# Patient Record
Sex: FEMALE | Race: WHITE | NOT HISPANIC OR LATINO | Employment: UNEMPLOYED | URBAN - METROPOLITAN AREA
[De-identification: names, ages, dates, MRNs, and addresses within clinical notes are randomized per-mention and may not be internally consistent; named-entity substitution may affect disease eponyms.]

---

## 2017-12-28 PROBLEM — R51.9 NONINTRACTABLE HEADACHE: Status: ACTIVE | Noted: 2017-12-28

## 2017-12-28 PROBLEM — R42 EPISODIC LIGHTHEADEDNESS: Status: ACTIVE | Noted: 2017-12-28

## 2017-12-28 PROBLEM — Z56.6 STRESS AT WORK: Status: ACTIVE | Noted: 2017-12-28

## 2018-08-20 PROBLEM — E78.1 HYPERTRIGLYCERIDEMIA: Status: ACTIVE | Noted: 2018-08-20

## 2019-03-05 PROBLEM — Z56.6 STRESS AT WORK: Status: RESOLVED | Noted: 2017-12-28 | Resolved: 2019-03-05

## 2021-11-04 PROBLEM — R42 EPISODIC LIGHTHEADEDNESS: Status: RESOLVED | Noted: 2017-12-28 | Resolved: 2021-11-04

## 2021-11-04 PROBLEM — R51.9 NONINTRACTABLE HEADACHE: Status: RESOLVED | Noted: 2017-12-28 | Resolved: 2021-11-04

## 2021-11-04 PROBLEM — M47.816 ARTHRITIS OF FACET JOINT OF LUMBAR SPINE: Status: ACTIVE | Noted: 2021-11-04

## 2023-02-01 PROBLEM — Z79.899 ENCOUNTER FOR LONG-TERM (CURRENT) USE OF MEDICATIONS: Status: ACTIVE | Noted: 2023-02-01

## 2023-02-01 PROBLEM — M54.50 MIDLINE LOW BACK PAIN WITHOUT SCIATICA: Status: ACTIVE | Noted: 2023-02-01

## 2023-02-01 PROBLEM — Z76.89 ENCOUNTER TO ESTABLISH CARE WITH NEW DOCTOR: Status: ACTIVE | Noted: 2023-02-01

## 2023-02-01 PROBLEM — Z12.11 ENCOUNTER FOR SCREENING FOR MALIGNANT NEOPLASM OF COLON: Status: ACTIVE | Noted: 2023-02-01

## 2023-02-01 PROBLEM — Z12.39 ENCOUNTER FOR BREAST CANCER SCREENING USING NON-MAMMOGRAM MODALITY: Status: ACTIVE | Noted: 2023-02-01

## 2023-03-23 ENCOUNTER — APPOINTMENT (OUTPATIENT)
Dept: ADMISSIONS | Facility: MEDICAL CENTER | Age: 53
DRG: 743 | End: 2023-03-23
Attending: SPECIALIST
Payer: MEDICAID

## 2023-03-24 ENCOUNTER — PRE-ADMISSION TESTING (OUTPATIENT)
Dept: ADMISSIONS | Facility: MEDICAL CENTER | Age: 53
DRG: 743 | End: 2023-03-24
Attending: SPECIALIST
Payer: MEDICAID

## 2023-03-24 ENCOUNTER — HOSPITAL ENCOUNTER (OUTPATIENT)
Dept: RADIOLOGY | Facility: MEDICAL CENTER | Age: 53
DRG: 743 | End: 2023-03-24
Attending: SPECIALIST | Admitting: SPECIALIST
Payer: MEDICAID

## 2023-03-24 DIAGNOSIS — Z01.811 PRE-OPERATIVE RESPIRATORY EXAMINATION: ICD-10-CM

## 2023-03-24 DIAGNOSIS — Z01.812 PRE-OPERATIVE LABORATORY EXAMINATION: ICD-10-CM

## 2023-03-24 DIAGNOSIS — Z01.810 PRE-OPERATIVE CARDIOVASCULAR EXAMINATION: ICD-10-CM

## 2023-03-24 LAB
ABO GROUP BLD: NORMAL
ALBUMIN SERPL BCP-MCNC: 4.5 G/DL (ref 3.2–4.9)
ALBUMIN/GLOB SERPL: 1.8 G/DL
ALP SERPL-CCNC: 89 U/L (ref 30–99)
ALT SERPL-CCNC: 18 U/L (ref 2–50)
ANION GAP SERPL CALC-SCNC: 10 MMOL/L (ref 7–16)
APTT PPP: 25.2 SEC (ref 24.7–36)
AST SERPL-CCNC: 20 U/L (ref 12–45)
BASOPHILS # BLD AUTO: 0.9 % (ref 0–1.8)
BASOPHILS # BLD: 0.05 K/UL (ref 0–0.12)
BILIRUB SERPL-MCNC: 0.4 MG/DL (ref 0.1–1.5)
BLD GP AB SCN SERPL QL: NORMAL
BUN SERPL-MCNC: 18 MG/DL (ref 8–22)
CALCIUM ALBUM COR SERPL-MCNC: 8.8 MG/DL (ref 8.5–10.5)
CALCIUM SERPL-MCNC: 9.2 MG/DL (ref 8.5–10.5)
CHLORIDE SERPL-SCNC: 104 MMOL/L (ref 96–112)
CO2 SERPL-SCNC: 25 MMOL/L (ref 20–33)
CREAT SERPL-MCNC: 0.85 MG/DL (ref 0.5–1.4)
EKG IMPRESSION: NORMAL
EOSINOPHIL # BLD AUTO: 0.12 K/UL (ref 0–0.51)
EOSINOPHIL NFR BLD: 2.2 % (ref 0–6.9)
ERYTHROCYTE [DISTWIDTH] IN BLOOD BY AUTOMATED COUNT: 41.8 FL (ref 35.9–50)
GFR SERPLBLD CREATININE-BSD FMLA CKD-EPI: 82 ML/MIN/1.73 M 2
GLOBULIN SER CALC-MCNC: 2.5 G/DL (ref 1.9–3.5)
GLUCOSE SERPL-MCNC: 95 MG/DL (ref 65–99)
HCG SERPL QL: NEGATIVE
HCT VFR BLD AUTO: 44.9 % (ref 37–47)
HGB BLD-MCNC: 14.6 G/DL (ref 12–16)
IMM GRANULOCYTES # BLD AUTO: 0.02 K/UL (ref 0–0.11)
IMM GRANULOCYTES NFR BLD AUTO: 0.4 % (ref 0–0.9)
INR PPP: 0.97 (ref 0.87–1.13)
LYMPHOCYTES # BLD AUTO: 2.11 K/UL (ref 1–4.8)
LYMPHOCYTES NFR BLD: 38 % (ref 22–41)
MCH RBC QN AUTO: 29.6 PG (ref 27–33)
MCHC RBC AUTO-ENTMCNC: 32.5 G/DL (ref 33.6–35)
MCV RBC AUTO: 91.1 FL (ref 81.4–97.8)
MONOCYTES # BLD AUTO: 0.35 K/UL (ref 0–0.85)
MONOCYTES NFR BLD AUTO: 6.3 % (ref 0–13.4)
NEUTROPHILS # BLD AUTO: 2.9 K/UL (ref 2–7.15)
NEUTROPHILS NFR BLD: 52.2 % (ref 44–72)
NRBC # BLD AUTO: 0 K/UL
NRBC BLD-RTO: 0 /100 WBC
PLATELET # BLD AUTO: 215 K/UL (ref 164–446)
PMV BLD AUTO: 9.3 FL (ref 9–12.9)
POTASSIUM SERPL-SCNC: 4.4 MMOL/L (ref 3.6–5.5)
PROT SERPL-MCNC: 7 G/DL (ref 6–8.2)
PROTHROMBIN TIME: 12.8 SEC (ref 12–14.6)
RBC # BLD AUTO: 4.93 M/UL (ref 4.2–5.4)
RH BLD: NORMAL
SODIUM SERPL-SCNC: 139 MMOL/L (ref 135–145)
WBC # BLD AUTO: 5.6 K/UL (ref 4.8–10.8)

## 2023-03-24 PROCEDURE — 80053 COMPREHEN METABOLIC PANEL: CPT

## 2023-03-24 PROCEDURE — 85025 COMPLETE CBC W/AUTO DIFF WBC: CPT

## 2023-03-24 PROCEDURE — 71045 X-RAY EXAM CHEST 1 VIEW: CPT

## 2023-03-24 PROCEDURE — 36415 COLL VENOUS BLD VENIPUNCTURE: CPT

## 2023-03-24 PROCEDURE — 86850 RBC ANTIBODY SCREEN: CPT

## 2023-03-24 PROCEDURE — 86900 BLOOD TYPING SEROLOGIC ABO: CPT

## 2023-03-24 PROCEDURE — 86901 BLOOD TYPING SEROLOGIC RH(D): CPT

## 2023-03-24 PROCEDURE — 84703 CHORIONIC GONADOTROPIN ASSAY: CPT

## 2023-03-24 PROCEDURE — 85730 THROMBOPLASTIN TIME PARTIAL: CPT

## 2023-03-24 PROCEDURE — 93005 ELECTROCARDIOGRAM TRACING: CPT

## 2023-03-24 PROCEDURE — 85610 PROTHROMBIN TIME: CPT

## 2023-03-24 PROCEDURE — 93010 ELECTROCARDIOGRAM REPORT: CPT | Performed by: INTERNAL MEDICINE

## 2023-03-30 ENCOUNTER — ANESTHESIA EVENT (OUTPATIENT)
Dept: SURGERY | Facility: MEDICAL CENTER | Age: 53
DRG: 743 | End: 2023-03-30
Payer: MEDICAID

## 2023-03-30 ENCOUNTER — ANESTHESIA (OUTPATIENT)
Dept: SURGERY | Facility: MEDICAL CENTER | Age: 53
DRG: 743 | End: 2023-03-30
Payer: MEDICAID

## 2023-03-30 ENCOUNTER — HOSPITAL ENCOUNTER (INPATIENT)
Facility: MEDICAL CENTER | Age: 53
LOS: 1 days | DRG: 743 | End: 2023-03-30
Attending: SPECIALIST | Admitting: SPECIALIST
Payer: MEDICAID

## 2023-03-30 VITALS
BODY MASS INDEX: 24.34 KG/M2 | HEART RATE: 78 BPM | HEIGHT: 62 IN | DIASTOLIC BLOOD PRESSURE: 78 MMHG | WEIGHT: 132.28 LBS | SYSTOLIC BLOOD PRESSURE: 141 MMHG | RESPIRATION RATE: 16 BRPM | TEMPERATURE: 98.7 F | OXYGEN SATURATION: 93 %

## 2023-03-30 LAB
ABO + RH BLD: NORMAL
HCG UR QL: NEGATIVE

## 2023-03-30 PROCEDURE — 88309 TISSUE EXAM BY PATHOLOGIST: CPT

## 2023-03-30 PROCEDURE — 700104 HCHG RX REV CODE 254: Performed by: SPECIALIST

## 2023-03-30 PROCEDURE — 700111 HCHG RX REV CODE 636 W/ 250 OVERRIDE (IP): Performed by: ANESTHESIOLOGY

## 2023-03-30 PROCEDURE — 160036 HCHG PACU - EA ADDL 30 MINS PHASE I: Performed by: SPECIALIST

## 2023-03-30 PROCEDURE — 160025 RECOVERY II MINUTES (STATS): Performed by: SPECIALIST

## 2023-03-30 PROCEDURE — 8E0W4CZ ROBOTIC ASSISTED PROCEDURE OF TRUNK REGION, PERCUTANEOUS ENDOSCOPIC APPROACH: ICD-10-PCS | Performed by: SPECIALIST

## 2023-03-30 PROCEDURE — A9270 NON-COVERED ITEM OR SERVICE: HCPCS | Performed by: ANESTHESIOLOGY

## 2023-03-30 PROCEDURE — 88331 PATH CONSLTJ SURG 1 BLK 1SPC: CPT

## 2023-03-30 PROCEDURE — 81025 URINE PREGNANCY TEST: CPT

## 2023-03-30 PROCEDURE — 88307 TISSUE EXAM BY PATHOLOGIST: CPT

## 2023-03-30 PROCEDURE — 700101 HCHG RX REV CODE 250: Performed by: SPECIALIST

## 2023-03-30 PROCEDURE — 160002 HCHG RECOVERY MINUTES (STAT): Performed by: SPECIALIST

## 2023-03-30 PROCEDURE — 88112 CYTOPATH CELL ENHANCE TECH: CPT

## 2023-03-30 PROCEDURE — 36415 COLL VENOUS BLD VENIPUNCTURE: CPT

## 2023-03-30 PROCEDURE — 160035 HCHG PACU - 1ST 60 MINS PHASE I: Performed by: SPECIALIST

## 2023-03-30 PROCEDURE — 0UBC7ZX EXCISION OF CERVIX, VIA NATURAL OR ARTIFICIAL OPENING, DIAGNOSTIC: ICD-10-PCS | Performed by: SPECIALIST

## 2023-03-30 PROCEDURE — 160046 HCHG PACU - 1ST 60 MINS PHASE II: Performed by: SPECIALIST

## 2023-03-30 PROCEDURE — 0UT7FZZ RESECTION OF BILATERAL FALLOPIAN TUBES, VIA NATURAL OR ARTIFICIAL OPENING WITH PERCUTANEOUS ENDOSCOPIC ASSISTANCE: ICD-10-PCS | Performed by: SPECIALIST

## 2023-03-30 PROCEDURE — 0UT9FZZ RESECTION OF UTERUS, VIA NATURAL OR ARTIFICIAL OPENING WITH PERCUTANEOUS ENDOSCOPIC ASSISTANCE: ICD-10-PCS | Performed by: SPECIALIST

## 2023-03-30 PROCEDURE — 502714 HCHG ROBOTIC SURGERY SERVICES: Performed by: SPECIALIST

## 2023-03-30 PROCEDURE — 0UT2FZZ RESECTION OF BILATERAL OVARIES, VIA NATURAL OR ARTIFICIAL OPENING WITH PERCUTANEOUS ENDOSCOPIC ASSISTANCE: ICD-10-PCS | Performed by: SPECIALIST

## 2023-03-30 PROCEDURE — 160031 HCHG SURGERY MINUTES - 1ST 30 MINS LEVEL 5: Performed by: SPECIALIST

## 2023-03-30 PROCEDURE — 00840 ANES IPER PX LOWER ABD NOS: CPT | Performed by: ANESTHESIOLOGY

## 2023-03-30 PROCEDURE — 160042 HCHG SURGERY MINUTES - EA ADDL 1 MIN LEVEL 5: Performed by: SPECIALIST

## 2023-03-30 PROCEDURE — 700101 HCHG RX REV CODE 250: Performed by: ANESTHESIOLOGY

## 2023-03-30 PROCEDURE — 700102 HCHG RX REV CODE 250 W/ 637 OVERRIDE(OP): Performed by: ANESTHESIOLOGY

## 2023-03-30 PROCEDURE — 306637 HCHG MISC ORTHO ITEM RC 0274

## 2023-03-30 PROCEDURE — 160048 HCHG OR STATISTICAL LEVEL 1-5: Performed by: SPECIALIST

## 2023-03-30 PROCEDURE — 700105 HCHG RX REV CODE 258: Performed by: SPECIALIST

## 2023-03-30 PROCEDURE — 88332 PATH CONSLTJ SURG EA ADD BLK: CPT | Mod: 59

## 2023-03-30 PROCEDURE — 88305 TISSUE EXAM BY PATHOLOGIST: CPT

## 2023-03-30 PROCEDURE — 160009 HCHG ANES TIME/MIN: Performed by: SPECIALIST

## 2023-03-30 PROCEDURE — 700111 HCHG RX REV CODE 636 W/ 250 OVERRIDE (IP)

## 2023-03-30 RX ORDER — BUPIVACAINE HYDROCHLORIDE AND EPINEPHRINE 2.5; 5 MG/ML; UG/ML
INJECTION, SOLUTION EPIDURAL; INFILTRATION; INTRACAUDAL; PERINEURAL
Status: DISCONTINUED | OUTPATIENT
Start: 2023-03-30 | End: 2023-03-30 | Stop reason: HOSPADM

## 2023-03-30 RX ORDER — ONDANSETRON 2 MG/ML
4 INJECTION INTRAMUSCULAR; INTRAVENOUS
Status: DISCONTINUED | OUTPATIENT
Start: 2023-03-30 | End: 2023-03-30 | Stop reason: HOSPADM

## 2023-03-30 RX ORDER — CEFOTETAN DISODIUM 2 G/20ML
INJECTION, POWDER, FOR SOLUTION INTRAMUSCULAR; INTRAVENOUS PRN
Status: DISCONTINUED | OUTPATIENT
Start: 2023-03-30 | End: 2023-03-30 | Stop reason: SURG

## 2023-03-30 RX ORDER — SODIUM CHLORIDE, SODIUM LACTATE, POTASSIUM CHLORIDE, CALCIUM CHLORIDE 600; 310; 30; 20 MG/100ML; MG/100ML; MG/100ML; MG/100ML
INJECTION, SOLUTION INTRAVENOUS CONTINUOUS
Status: DISCONTINUED | OUTPATIENT
Start: 2023-03-30 | End: 2023-03-30 | Stop reason: HOSPADM

## 2023-03-30 RX ORDER — KETOROLAC TROMETHAMINE 30 MG/ML
INJECTION, SOLUTION INTRAMUSCULAR; INTRAVENOUS PRN
Status: DISCONTINUED | OUTPATIENT
Start: 2023-03-30 | End: 2023-03-30 | Stop reason: SURG

## 2023-03-30 RX ORDER — INDOCYANINE GREEN AND WATER 25 MG
KIT INJECTION
Status: DISCONTINUED | OUTPATIENT
Start: 2023-03-30 | End: 2023-03-30 | Stop reason: HOSPADM

## 2023-03-30 RX ORDER — DEXAMETHASONE SODIUM PHOSPHATE 4 MG/ML
INJECTION, SOLUTION INTRA-ARTICULAR; INTRALESIONAL; INTRAMUSCULAR; INTRAVENOUS; SOFT TISSUE PRN
Status: DISCONTINUED | OUTPATIENT
Start: 2023-03-30 | End: 2023-03-30 | Stop reason: SURG

## 2023-03-30 RX ORDER — DIPHENHYDRAMINE HYDROCHLORIDE 50 MG/ML
12.5 INJECTION INTRAMUSCULAR; INTRAVENOUS
Status: DISCONTINUED | OUTPATIENT
Start: 2023-03-30 | End: 2023-03-30 | Stop reason: HOSPADM

## 2023-03-30 RX ORDER — LIDOCAINE HYDROCHLORIDE 20 MG/ML
INJECTION, SOLUTION EPIDURAL; INFILTRATION; INTRACAUDAL; PERINEURAL PRN
Status: DISCONTINUED | OUTPATIENT
Start: 2023-03-30 | End: 2023-03-30 | Stop reason: SURG

## 2023-03-30 RX ORDER — ONDANSETRON 2 MG/ML
INJECTION INTRAMUSCULAR; INTRAVENOUS PRN
Status: DISCONTINUED | OUTPATIENT
Start: 2023-03-30 | End: 2023-03-30 | Stop reason: SURG

## 2023-03-30 RX ORDER — PHENYLEPHRINE HYDROCHLORIDE 10 MG/ML
INJECTION, SOLUTION INTRAMUSCULAR; INTRAVENOUS; SUBCUTANEOUS PRN
Status: DISCONTINUED | OUTPATIENT
Start: 2023-03-30 | End: 2023-03-30 | Stop reason: SURG

## 2023-03-30 RX ORDER — HALOPERIDOL 5 MG/ML
1 INJECTION INTRAMUSCULAR
Status: DISCONTINUED | OUTPATIENT
Start: 2023-03-30 | End: 2023-03-30 | Stop reason: HOSPADM

## 2023-03-30 RX ORDER — HALOPERIDOL 5 MG/ML
INJECTION INTRAMUSCULAR
Status: COMPLETED
Start: 2023-03-30 | End: 2023-03-30

## 2023-03-30 RX ADMIN — HYDROCODONE BITARTRATE AND ACETAMINOPHEN 15 MG: 7.5; 325 SOLUTION ORAL at 18:15

## 2023-03-30 RX ADMIN — HALOPERIDOL 1 MG: 5 INJECTION INTRAMUSCULAR at 17:49

## 2023-03-30 RX ADMIN — FENTANYL CITRATE 50 MCG: 50 INJECTION, SOLUTION INTRAMUSCULAR; INTRAVENOUS at 18:42

## 2023-03-30 RX ADMIN — FENTANYL CITRATE 50 MCG: 50 INJECTION, SOLUTION INTRAMUSCULAR; INTRAVENOUS at 17:45

## 2023-03-30 RX ADMIN — FENTANYL CITRATE 50 MCG: 50 INJECTION, SOLUTION INTRAMUSCULAR; INTRAVENOUS at 18:19

## 2023-03-30 RX ADMIN — FENTANYL CITRATE 50 MCG: 50 INJECTION, SOLUTION INTRAMUSCULAR; INTRAVENOUS at 17:54

## 2023-03-30 RX ADMIN — SODIUM CHLORIDE, POTASSIUM CHLORIDE, SODIUM LACTATE AND CALCIUM CHLORIDE: 600; 310; 30; 20 INJECTION, SOLUTION INTRAVENOUS at 13:55

## 2023-03-30 RX ADMIN — CEFOTETAN DISODIUM 2 G: 2 INJECTION, POWDER, FOR SOLUTION INTRAMUSCULAR; INTRAVENOUS at 15:30

## 2023-03-30 RX ADMIN — DEXAMETHASONE SODIUM PHOSPHATE 4 MG: 4 INJECTION, SOLUTION INTRA-ARTICULAR; INTRALESIONAL; INTRAMUSCULAR; INTRAVENOUS; SOFT TISSUE at 15:23

## 2023-03-30 RX ADMIN — FENTANYL CITRATE 100 MCG: 50 INJECTION, SOLUTION INTRAMUSCULAR; INTRAVENOUS at 17:18

## 2023-03-30 RX ADMIN — LIDOCAINE HYDROCHLORIDE 100 MG: 20 INJECTION, SOLUTION EPIDURAL; INFILTRATION; INTRACAUDAL at 15:23

## 2023-03-30 RX ADMIN — PROPOFOL 200 MG: 10 INJECTION, EMULSION INTRAVENOUS at 15:23

## 2023-03-30 RX ADMIN — SUGAMMADEX 200 MG: 100 INJECTION, SOLUTION INTRAVENOUS at 17:34

## 2023-03-30 RX ADMIN — ONDANSETRON 4 MG: 2 INJECTION INTRAMUSCULAR; INTRAVENOUS at 15:23

## 2023-03-30 RX ADMIN — KETOROLAC TROMETHAMINE 30 MG: 30 INJECTION, SOLUTION INTRAMUSCULAR at 15:23

## 2023-03-30 RX ADMIN — HALOPERIDOL LACTATE 1 MG: 5 INJECTION, SOLUTION INTRAMUSCULAR at 17:49

## 2023-03-30 RX ADMIN — PHENYLEPHRINE HYDROCHLORIDE 100 MCG: 10 INJECTION INTRAVENOUS at 16:17

## 2023-03-30 RX ADMIN — ROCURONIUM BROMIDE 50 MG: 10 INJECTION, SOLUTION INTRAVENOUS at 15:23

## 2023-03-30 ASSESSMENT — FIBROSIS 4 INDEX: FIB4 SCORE: 1.16

## 2023-03-30 ASSESSMENT — PAIN DESCRIPTION - PAIN TYPE
TYPE: SURGICAL PAIN

## 2023-03-30 NOTE — ANESTHESIA PREPROCEDURE EVALUATION
Case: [id number] Date/Time: 03/30/23 1332    Procedure: ROBOTIC HYSTERECTMOY, BILATERAL SALPINGO OOPHORECTOMY, RIGHT AND/OR LEFT SENTINEL LYMPH NODE INJECTION, SAMPLING, SURGICAL STAGING, POSSIBLE RIGHT AND/OR LEFT OR BILATERAL PELVIC AND PERIAORTIC NODE DISSECTION    Pre-op diagnosis: ENDOMETRIAL NEOPLASIA, PELVIC PAIN    Location: [facility] OR 17 / SURGERY [facility]    Surgeons: Brandon Doran M.D.          Relevant Problems   Other   (positive) Arthritis of facet joint of lumbar spine   (positive) Encounter for breast cancer screening using non-mammogram modality       Physical Exam    Airway   Mallampati: II  TM distance: >3 FB  Neck ROM: full       Cardiovascular - normal exam  Rhythm: regular  Rate: normal  (-) murmur     Dental - normal exam           Pulmonary - normal exam  Breath sounds clear to auscultation     Abdominal    Neurological - normal exam                 Anesthesia Plan    ASA 2       Plan - general       Airway plan will be ETT          Induction: intravenous      Pertinent diagnostic labs and testing reviewed    Informed Consent:    Anesthetic plan and risks discussed with patient.

## 2023-03-31 LAB — PATHOLOGY CONSULT NOTE: NORMAL

## 2023-03-31 NOTE — ANESTHESIA TIME REPORT
Anesthesia Start and Stop Event Times     Date Time Event    3/30/2023 1515 Ready for Procedure     1517 Anesthesia Start     1747 Anesthesia Stop        Responsible Staff  03/30/23    Name Role Begin End    Desean Ramos M.D. Anesth 1517 1747        Overtime Reason:  no overtime (within assigned shift)    Comments:

## 2023-03-31 NOTE — DISCHARGE INSTRUCTIONS
Follow up with surgeon in office as instructed or sooner if needed. Call to make follow up appointment if you don't already have one. Also call for questions or concerns.    Call the office [phone] if you develop any fevers, chills, nausea/vomiting, heavy vaginal bleeding or redness, tenderness, and/or drainage from your wounds, persistent watery discharge while walking or stool draining from the vagina.    No heavy lifting greater than 10 pounds for a minimum of 6 weeks, and until cleared by our office.    Nothing in the vagina (ie: no tampons douching, intercourse,) for a minimum of 6 weeks, and until cleared by our office    Showering with warm water is OK, no bathing or swimming or submersing in water.    You may remove wound dressing tomorrow and place loose bandages for the next 2 weeks.    You may have vaginal spotting or light bleeding which is normal.    No driving, alcohol, spending money, signing legal documents, or operating mechanical equipment x 24 hours.    No driving while taking narcotics.    If you have not had a bowel movement for 2 days after surgery, please take over the counter Milk of Magnesium, 1 tablespoon every 4 hours. After 4 doses and if you still have not had a bowel movement, please call your doctor.    You may eat a soft diet, such a soup and liquids, after surgery and if tolerating you may resume your regular diet.    You should have a responsible adult with your for the next 24 hours due to having anesthesia.    A sore throat is normal today.    If you notice trouble breathing, fever, chills, difficulty urinating, excessive nausea, bleeding, or excessive pain call the Surgeon and come back to the Emergency Room.       HOME CARE INSTRUCTIONS    ACTIVITY: Rest and take it easy for the first 24 hours.  A responsible adult is recommended to remain with you during that time.  It is normal to feel sleepy.  We encourage you to not do anything that requires balance, judgment or  coordination.    FOR 24 HOURS DO NOT:  Drive, operate machinery or run household appliances.  Drink beer or alcoholic beverages.  Make important decisions or sign legal documents.      DIET: To avoid nausea, slowly advance diet as tolerated, avoiding spicy or greasy foods for the first day.  Add more substantial food to your diet according to your physician's instructions.  INCREASE FLUIDS AND FIBER TO AVOID CONSTIPATION.      MEDICATIONS: Resume taking daily medication.  Take prescribed pain medication with food.  If no medication is prescribed, you may take non-aspirin pain medication if needed.  PAIN MEDICATION CAN BE VERY CONSTIPATING.  Take a stool softener or laxative such as senokot, pericolace, or milk of magnesia if needed.    Prescription given for [place] sent to [facility].  Last pain medication given at 6:15pm.    A follow-up appointment should be arranged with your doctor- follow up as instructed by Dr Doran; call to schedule.    You should CALL YOUR PHYSICIAN if you develop:  Fever greater than 101 degrees F.  Pain not relieved by medication, or persistent nausea or vomiting.  Excessive bleeding (blood soaking through dressing) or unexpected drainage from the wound.  Extreme redness or swelling around the incision site, drainage of pus or foul smelling drainage.  Inability to urinate or empty your bladder within 8 hours.  Problems with breathing or chest pain.    You should call 911 if you develop problems with breathing or chest pain.  If you are unable to contact your doctor or surgical center, you should go to the nearest emergency room or urgent care center.      Physician's telephone #: [phone]    MILD FLU-LIKE SYMPTOMS ARE NORMAL.  YOU MAY EXPERIENCE GENERALIZED MUSCLE ACHES, THROAT IRRITATION, HEADACHE AND/OR SOME NAUSEA.    If any questions arise, call your doctor.  If your doctor is not available, please feel free to call the Surgical Center at [phone].  The Center is open  Monday through Friday from 7AM to 7PM.      A registered nurse may call you a few days after your surgery to see how you are doing after your procedure.    You may also receive a survey in the mail within the next two weeks and we ask that you take a few moments to complete the survey and return it to us.  Our goal is to provide you with very good care and we value your comments.     Depression / Suicide Risk    As you are discharged from this [facility] facility, it is important to learn how to keep safe from harming yourself.    Recognize the warning signs:  Abrupt changes in personality, positive or negative- including increase in energy   Giving away possessions  Change in eating patterns- significant weight changes-  positive or negative  Change in sleeping patterns- unable to sleep or sleeping all the time   Unwillingness or inability to communicate  Depression  Unusual sadness, discouragement and loneliness  Talk of wanting to die  Neglect of personal appearance   Rebelliousness- reckless behavior  Withdrawal from people/activities they love  Confusion- inability to concentrate     If you or a loved one observes any of these behaviors or has concerns about self-harm, here's what you can do:  Talk about it- your feelings and reasons for harming yourself  Remove any means that you might use to hurt yourself (examples: pills, rope, extension cords, firearm)  Get professional help from the community (Mental Health, Substance Abuse, psychological counseling)  Do not be alone:Call your Safe Contact- someone whom you trust who will be there for you.  Call your local CRISIS HOTLINE 193-3506 or 460-791-4854  Call your local Children's Mobile Crisis Response Team [place] (009) 883-4628 or www.Videoplaza  Call the toll free National Suicide Prevention Hotlines   National Suicide Prevention Lifeline 925-522-WBZQ (5267)  National Hope Line Network 800-SUICIDE (863-5707)    I acknowledge receipt and  understanding of these Home Care instructions.

## 2023-03-31 NOTE — ANESTHESIA POSTPROCEDURE EVALUATION
Patient: Miladys Holloway    Procedure Summary     Date: 03/30/23 Room / Location: [place] [address] / SURGERY [facility]    Anesthesia Start: 1517 Anesthesia Stop: 1747    Procedures:       ROBOTIC HYSTERECTMOY, BILATERAL SALPINGO OOPHORECTOMY, RIGHT AND LEFT SENTINEL LYMPH NODE INJECTION (Abdomen)      CONE BIOPSY, CERVIX, USING LEEP (Cervix) Diagnosis: (COMPLEX ATYPICAL HYPERPLASIA)    Surgeons: Brandon Doran M.D. Responsible Provider: Desean Ramos M.D.    Anesthesia Type: general ASA Status: 2          Final Anesthesia Type: general  Last vitals  BP   Blood Pressure: 126/74    Temp   36.8 °C (98.3 °F)    Pulse   66   Resp   14    SpO2   93 %      Anesthesia Post Evaluation    Patient location during evaluation: PACU  Patient participation: complete - patient participated  Level of consciousness: awake and alert    Airway patency: patent  Anesthetic complications: no  Cardiovascular status: hemodynamically stable  Respiratory status: acceptable  Hydration status: euvolemic    PONV: none          There were no known notable events for this encounter.     Nurse Pain Score: 7 (NPRS)

## 2023-03-31 NOTE — ANESTHESIA PROCEDURE NOTES
Airway    Date/Time: 3/30/2023 3:23 PM  Performed by: Desean Ramos M.D.  Authorized by: Desean Ramos M.D.     Location:  OR  Urgency:  Elective  Indications for Airway Management:  Anesthesia      Spontaneous Ventilation: absent    Sedation Level:  Deep  Preoxygenated: Yes    Patient Position:  Sniffing  Final Airway Type:  Endotracheal airway  Final Endotracheal Airway:  ETT  Cuffed: Yes    Technique Used for Successful ETT Placement:  Direct laryngoscopy    Insertion Site:  Oral  Blade Type:  Watkins  Laryngoscope Blade/Videolaryngoscope Blade Size:  2  ETT Size (mm):  7.0  Measured from:  Teeth  ETT to Teeth (cm):  22  Placement Verified by: auscultation and capnometry    Cormack-Lehane Classification:  Grade IIa - partial view of glottis  Number of Attempts at Approach:  1

## 2023-03-31 NOTE — OP REPORT
PreOp Diagnosis: Complex hyperplasia with atypia  Cervical ectropion versus cervicitis versus cancer.  No evidence of cervical cancer           PostOp Diagnosis: Same as above        Procedure(s):  ROBOTIC HYSTERECTMOY, BILATERAL SALPINGO OOPHORECTOMY, RIGHT AND LEFT SENTINEL LYMPH NODE INJECTION - Wound Class: Clean Contaminated  CONE BIOPSY, CERVIX, USING LEEP - Wound Class: Clean Contaminated     Surgeon(s):  Brandon Doran M.D.     Anesthesiologist/Type of Anesthesia:  Anesthesiologist: Desean Ramos M.D./General     Surgical Staff:  Assistant: Mansoor Olmstead P.A.-C.  Circulator: Bibiana Nunn R.N.  Relief Circulator: Lima Mcclain  Scrub Person: Gala Becerril; Lalitha Gregory     Specimens removed if any:  ID Type Source Tests Collected by Time Destination   A : cervical cone Tissue Cervix PATHOLOGY SPECIMEN Brandon Doran M.D. 3/30/2023  3:54 PM     B : Uterus, Cervix, Bilateral Fallopian Tubes and Ovaries Tissue Uterus PATHOLOGY SPECIMEN Brandon Doran M.D. 3/30/2023  4:53 PM     C : Pelvic Washings for Cytology Other Other PATHOLOGY SPECIMEN Brandon Doran M.D. 3/30/2023  4:57 PM           Estimated Blood Loss: 50 cc     Findings: No evidence of ascites.  Normal right lobe diaphragm.  Liver capsule smooth.  Stomach grossly normal.  Abdominal peritoneal surfaces remarkable.  Omentum.  Grossly normal.     In the pelvis uterus was approximately 10 weeks in size.  The adnexal structures were unremarkable.  Previous partial salpingectomy was noted.  There is no seeding along the bladder pelvic and cul-de-sac peritoneum.     [place] lymph node mapping revealed symmetrical sentinel lymph node mapping in the external iliac lymph nodes.     Should be noted that we perform the LEEP cone biopsy prior to proceeding with the hysterectomy and sentinel lymph node mapping.  This was done because examination under anesthesia revealed the cervix at the transformation zone the cervix was quite beefy red.  It appeared clinically  consistent with either cervicitis versus ectropion versus underlying invasive cancer.  This was not appreciated at the time of my clinical examination with the patient in the office.  This was something new.  I was concerned that she may have underlying cervical cancer which was certainly changed to course of treatment.  Thus I elected to perform a LEEP cone biopsy excising this lesion and ultimately the frozen section of the cervical biopsy came back as negative with no evidence of dysplasia or cancer.  Thus given this finding we proceeded on with our intended procedure of robotic hysterectomy with bilateral surveillant for ectomy with sentinel lymph node mapping.     Complications: none       Procedure: After achieving adequate anesthesia patient was prepped and draped and placed modified dorsolithotomy position.  Surgical timeout was called and have the surgical team agreed with procedure.    Initially I performed an examination anesthesia.  Prior to proceeding with the sentinel lymph node mapping I noted the ulceration on the cervix.  I was concerned that this may be underlying cervical cancer which was certainly dictate a surgical difference in surgical management thus I elected to perform a LEEP cone biopsy performed a frozen section on the LEEP cone biopsy to ensure that there was no underlying cervical cancer.  This was performed with a 2 x 2 cm diathermy loop excision which was performed without difficulty.  Frozen section was performed which showed no evidence of invasive cancer with dysplasia thus I proceeded on with the robotic hysterectomy with bilateral salpingo directed me with sentinel lymph node mapping.    Heavy weighted speculum was placed in the posterior vagina. Cervix was brought to view. The anterior lip of the cervix was grasp with single tooth tenaculum. 2 cc of Indocyanide green was injected into the cervical stroma to a depth about 2-3 mm at 3 and 9 o'clock position. Following completion  of this I then proceeded on with the robotic portion of the procedure.    A 1 cm umbilical incision was made.  A Veress needle was inserted without difficulty. Abdominal pressure was noted to be < 5mm Hg.  Pneumoperitoneum was achieved to the abdominal pressure of 15 mmHg.  A 8 mm trocar was inserted without difficulty.  After completion of this, a laparoscope was placed through this port and exploratory laparoscopy revealed the above findings.  Two 8 mm ports were placed in the right upper quadrant 8 cm apart while one 8 mm port was placed in the left upper quadrant 8 cm apart.  After completion of this, the patient was placed in steep Trendelenburg position.  The robotic system was then docked and after docking the robotic system, the instrumentation was inserted under direct laparoscopic visualization to insure that there was no injury to the abdominal contents.  Once this was completed, the robotic camera was then docked.  We then proceeded with our da Ginny portion of the procedure.    The posterior leaf of the broad ligament was incised.  The pararectal spaces were developed.  The ureter was identified in the medial leaf of the peritoneum. Underfire fly the sentinel lymph nodes were identified. The sentinel lymph nodes were then skeletonized, isolated and resected. The uterus was then elevated ventrally exposing the cul de sac. Posterior colpotomy incision was then made between the uterosacral ligaments and the sentinel nodes were removed vaginally and send for frozen section. The avascular space of Graves was then created.  The right and left pelvic ureters were identified.  The ovarian vessels were then subsequently isolated and bipolar cautery was used to cauterize the right and left IP and subsequently divided.  The medial leaf of the peritoneum was then incised down to the level of the uterosacral ligament.  Ureters were dissected laterally while developing the [place] space .  The round ligament was  then divided followed by the anterior broad leaf ligament. The Prograsp from the  robotic “3rd arm” was then used to grasp the triple pedicle and counter traction was provided while the cardinal ligament was exposed. Uterine artery and vein were then subsequently skeletonized.  Using the bipolar cautery, the uterine artery and vein were then cauterized juxtaposition to the fundus of the uterus.  The remainder of the lower uterine segment was likewise divided. After completion of this, the uterus was then retracted ventrally and the uterosacral ligaments were then independently divided.  Great care was then taken to clearly not injure the ureter.  After the uterosacral ligaments were then divided, the anterior branches of the uterine vessels were then subsequently skeletonized and cauterized with bipolar cautery and divided.  The bladder peritoneum was then subsequently taken down.  Once we were assured that the bladder was dissected off the paracervical fascia, the posterior colpotomy posterior colpotomy was made.  The vagina was circumferentially incised to complete the hysterectomy and BSO.  The specimen was removed through the vaginal vault without difficulty.  The vaginal cuff was then closed with O [place] PDS suture in 2 layer running fashion.      Pelvic washings were then obtained. The pelvis was then copiously irrigated with water and we established hemostasis. We then accounted for sponges and needles. Once this was confirmed correct, robotic instrumentation with withdraw and pneumoperitoneum was allowed to escape through the 8 mm ports. After appropriate expelling all the intrabdominal pneumoperitoneum, we irrigated the subcutaneous tissue with water. The skin was reapproximated with 3-0 monocryl suture. The incision was then injected with 0.25% Marcaine for local anesthetic effect. Dressings were appropriately placed.     Patient tolerated the procedure well without any difficulties and was extubated and  transferred to the PACU in stable excellent condition.       Brandon Doran M.D.

## 2023-03-31 NOTE — OR NURSING
Patient arrived in PACU, VSS. 4 lap sites covered with Telfa and tegaderm. Patient awake with pain and nausea. Medicated for both per MAR.   Verified with patient that she does have pain medication to get her through the night at home. Explained that pharmacy that meds sent to is closed.   Report called to MACARIO Daniels. Transported to phase 2 room #31.

## 2023-03-31 NOTE — OR SURGEON
Immediate Post OP Note    PreOp Diagnosis: Complex hyperplasia with atypia  Cervical ectropion versus cervicitis versus cancer.  No evidence of cervical cancer        PostOp Diagnosis: Same as above      Procedure(s):  ROBOTIC HYSTERECTMOY, BILATERAL SALPINGO OOPHORECTOMY, RIGHT AND LEFT SENTINEL LYMPH NODE INJECTION - Wound Class: Clean Contaminated  CONE BIOPSY, CERVIX, USING LEEP - Wound Class: Clean Contaminated    Surgeon(s):  Brandon Doran M.D.    Anesthesiologist/Type of Anesthesia:  Anesthesiologist: Desean Ramos M.D./General    Surgical Staff:  Assistant: Mansoor Olmstead P.A.-C.  Circulator: Bibiana Nunn R.N.  Relief Circulator: Lima Mcclain  Scrub Person: Gala Becerril; Lalitha Gregory    Specimens removed if any:  ID Type Source Tests Collected by Time Destination   A : cervical cone Tissue Cervix PATHOLOGY SPECIMEN Brandon Doran M.D. 3/30/2023  3:54 PM    B : Uterus, Cervix, Bilateral Fallopian Tubes and Ovaries Tissue Uterus PATHOLOGY SPECIMEN Brandon Droan M.D. 3/30/2023  4:53 PM    C : Pelvic Washings for Cytology Other Other PATHOLOGY SPECIMEN rBandon Doran M.D. 3/30/2023  4:57 PM        Estimated Blood Loss: 50 cc    Findings: No evidence of ascites.  Normal right lobe diaphragm.  Liver capsule smooth.  Stomach grossly normal.  Abdominal peritoneal surfaces remarkable.  Omentum.  Grossly normal.    In the pelvis uterus was approximately 10 weeks in size.  The adnexal structures were unremarkable.  Previous partial salpingectomy was noted.  There is no seeding along the bladder pelvic and cul-de-sac peritoneum.    [place] lymph node mapping revealed symmetrical sentinel lymph node mapping in the external iliac lymph nodes.    Should be noted that we perform the LEEP cone biopsy prior to proceeding with the hysterectomy and sentinel lymph node mapping.  This was done because examination under anesthesia revealed the cervix at the transformation zone the cervix was quite beefy red.  It appeared  clinically consistent with either cervicitis versus ectropion versus underlying invasive cancer.  This was not appreciated at the time of my clinical examination with the patient in the office.  This was something new.  I was concerned that she may have underlying cervical cancer which was certainly changed to course of treatment.  Thus I elected to perform a LEEP cone biopsy excising this lesion and ultimately the frozen section of the cervical biopsy came back as negative with no evidence of dysplasia or cancer.  Thus given this finding we proceeded on with our intended procedure of robotic hysterectomy with bilateral surveillant for ectomy with sentinel lymph node mapping.    Complications: none        3/30/2023 5:53 PM Brandon Doran M.D.

## 2023-06-20 PROBLEM — G89.29 CHRONIC MIDLINE LOW BACK PAIN WITHOUT SCIATICA: Status: ACTIVE | Noted: 2023-06-20

## 2023-06-20 PROBLEM — M54.50 CHRONIC MIDLINE LOW BACK PAIN WITHOUT SCIATICA: Status: ACTIVE | Noted: 2023-06-20

## 2023-07-12 PROBLEM — G89.4 CHRONIC PAIN SYNDROME: Status: ACTIVE | Noted: 2023-07-12

## (undated) DEVICE — GLOVE COTTON STERILE (50/CA)

## (undated) DEVICE — SPATULA PERMANENT CAUTERY DA VINCI 10X'S REUSABLE

## (undated) DEVICE — GLOVE BIOGEL PI INDICATOR SZ 6.5 SURGICAL PF LF - (50/BX 4BX/CA)

## (undated) DEVICE — SEAL 5MM-8MM UNIVERSAL  BOX OF 10

## (undated) DEVICE — FORCEPS PROGRASP (18UN/EA)

## (undated) DEVICE — GLOVE SZ 6.5 BIOGEL PI MICRO - PF LF (50PR/BX)

## (undated) DEVICE — SET EXTENSION WITH 2 PORTS (48EA/CA) ***PART #2C8610 IS A SUBSTITUTE*****

## (undated) DEVICE — SUTURE GENERAL

## (undated) DEVICE — TOWEL STOP TIMEOUT SAFETY FLAG (40EA/CA)

## (undated) DEVICE — SYRINGE ASEPTO - (50EA/CA

## (undated) DEVICE — PACK GYN DAVINCI (1EA/CA)

## (undated) DEVICE — DRAPE COLUMN  BOX OF 20

## (undated) DEVICE — SUTURE 3-0 MONOCRYL PLUS PS-1 - 27 INCH (36/BX)

## (undated) DEVICE — ELECTRODE DUAL RETURN W/ CORD - (50/PK)

## (undated) DEVICE — PACK TRENGUARD 450 PROCEDURE (12EA/CA)

## (undated) DEVICE — SUTURE QUILL 0 PDO 14X14 - (12/BX)

## (undated) DEVICE — ARMREST CRADLE FOAM - (2PR/PK 12PR/CA)

## (undated) DEVICE — CANISTER SUCTION 3000ML MECHANICAL FILTER AUTO SHUTOFF MEDI-VAC NONSTERILE LF DISP  (40EA/CA)

## (undated) DEVICE — GLOVE BIOGEL INDICATOR SZ 6.5 SURGICAL PF LTX - (50PR/BX 4BX/CA)

## (undated) DEVICE — ROBOTIC SURGERY SERVICES

## (undated) DEVICE — NEEDLE INSUFFLATION FOR STEP - (12/BX)

## (undated) DEVICE — LACTATED RINGERS INJ 1000 ML - (14EA/CA 60CA/PF)

## (undated) DEVICE — DRAPE ARM  BOX OF 20

## (undated) DEVICE — COVER LIGHT HANDLE ALC PLUS DISP (18EA/BX)

## (undated) DEVICE — GLOVE BIOGEL PI ORTHO SZ 7.5 PF LF (40PR/BX)

## (undated) DEVICE — SLEEVE VASO CALF MED - (10PR/CA)

## (undated) DEVICE — TUBING CLEARLINK DUO-VENT - C-FLO (48EA/CA)

## (undated) DEVICE — SET LEADWIRE 5 LEAD BEDSIDE DISPOSABLE ECG (1SET OF 5/EA)

## (undated) DEVICE — SUCTION INSTRUMENT YANKAUER BULBOUS TIP W/O VENT (50EA/CA)

## (undated) DEVICE — ELECTRODE3MM DISP LLETZ BALL - (10EA/CA)

## (undated) DEVICE — SET SUCTION/IRRIGATION WITH DISPOSABLE TIP (6/CA )PART #0250-070-520 IS A SUB

## (undated) DEVICE — SENSOR OXIMETER ADULT SPO2 RD SET (20EA/BX)

## (undated) DEVICE — GOWN SURGEONS X-LARGE - DISP. (30/CA)

## (undated) DEVICE — SUTURE 2-0 VICRYL PLUS CT-2 - 27 INCH (36/BX)

## (undated) DEVICE — COVER FOOT UNIVERSAL DISP. - (25EA/CA)

## (undated) DEVICE — TRAY SRGPRP PVP IOD WT PRP - (20/CA)

## (undated) DEVICE — GOWN WARMING STANDARD FLEX - (30/CA)

## (undated) DEVICE — PAD OR TABLE DA VINCI 2IN X 20IN X 72IN - (12EA/CA)

## (undated) DEVICE — ELECTRODE LEEP 2.0X12 - (10EA/CA)

## (undated) DEVICE — DRESSING NON-ADHERING 8 X 3 - (50/BX)

## (undated) DEVICE — PENCIL ELECTSURG 10FT BTN SWH - (50/CA)

## (undated) DEVICE — NEEDLE DRIVER MEGA SUTURECUT DA VINCI 15X'S REUSABLE

## (undated) DEVICE — DRESSING TRANSPARENT FILM TEGADERM 2.375 X 2.75"  (100EA/BX)"

## (undated) DEVICE — BIPOLAR FORCE DA VINCI 12X'S REISABLE